# Patient Record
Sex: FEMALE | Race: WHITE
[De-identification: names, ages, dates, MRNs, and addresses within clinical notes are randomized per-mention and may not be internally consistent; named-entity substitution may affect disease eponyms.]

---

## 2017-10-26 NOTE — MM
Reason for exam: additional evaluation requested from abnormal screening.

Last mammogram was performed less than 1 month ago.



History:

Patient is postmenopausal.

Took estrogen for 12 years beginning at age 49.



Physical Findings:

Nurse did not find any significant physical abnormalities on exam.



MG 3D Work Up W/Cad LT

Spot compression CC, spot compression MLO, and ML view(s) were taken of the left 

breast.

Prior study comparison: October 24, 2017, bilateral MG 3d screening mammo w/cad.  

October 18, 2016, bilateral MG screening mammo w CAD.

The breast tissue is heterogeneously dense. This may lower the sensitivity of 

mammography.  There is no discrete abnormality.



These results were verbally communicated with the patient and result sheet given 

to the patient on 10/26/17.





ASSESSMENT: Negative, BI-RAD 1



RECOMMENDATION:

Return to routine screening mammogram schedule for both breasts.

## 2018-08-21 ENCOUNTER — HOSPITAL ENCOUNTER (OUTPATIENT)
Dept: HOSPITAL 47 - LABWHC1 | Age: 68
Discharge: HOME | End: 2018-08-21
Attending: ORTHOPAEDIC SURGERY
Payer: MEDICARE

## 2018-08-21 DIAGNOSIS — Z01.812: Primary | ICD-10-CM

## 2018-08-21 DIAGNOSIS — N28.9: ICD-10-CM

## 2018-08-21 LAB — BUN SERPL-SCNC: 20 MG/DL (ref 7–17)

## 2018-08-21 PROCEDURE — 36415 COLL VENOUS BLD VENIPUNCTURE: CPT

## 2018-08-21 PROCEDURE — 84520 ASSAY OF UREA NITROGEN: CPT

## 2018-08-21 PROCEDURE — 82565 ASSAY OF CREATININE: CPT

## 2018-09-17 ENCOUNTER — HOSPITAL ENCOUNTER (OUTPATIENT)
Dept: HOSPITAL 47 - OR | Age: 68
Discharge: HOME | End: 2018-09-17
Attending: ORTHOPAEDIC SURGERY
Payer: MEDICARE

## 2018-09-17 VITALS — RESPIRATION RATE: 16 BRPM

## 2018-09-17 VITALS — TEMPERATURE: 96.8 F

## 2018-09-17 VITALS — HEART RATE: 64 BPM | DIASTOLIC BLOOD PRESSURE: 79 MMHG | SYSTOLIC BLOOD PRESSURE: 126 MMHG

## 2018-09-17 VITALS — BODY MASS INDEX: 24.7 KG/M2

## 2018-09-17 DIAGNOSIS — I10: ICD-10-CM

## 2018-09-17 DIAGNOSIS — E78.5: ICD-10-CM

## 2018-09-17 DIAGNOSIS — R22.41: Primary | ICD-10-CM

## 2018-09-17 DIAGNOSIS — Z79.899: ICD-10-CM

## 2018-09-17 DIAGNOSIS — M81.0: ICD-10-CM

## 2018-09-17 PROCEDURE — 88304 TISSUE EXAM BY PATHOLOGIST: CPT

## 2018-09-17 PROCEDURE — 27340 REMOVAL OF KNEECAP BURSA: CPT

## 2018-09-19 NOTE — P.OP
Date of Procedure: 09/17/18


Procedure(s) Performed: 


incisional biopsy anterior right knee mass


mass c/w bursal tissue with trapped fluid.  3cm size


no cyst seen


sent to pathology





PREOPERATIVE DIAGNOSES:    1.  Right knee anterior mass consistent with cyst





POSTOPERATIVE DIAGNOSES:   1.  Right knee anterior mass, consistent with 

trapped fluid in prepatellar bursa





PROCEDURES PERFORMED:   1.   Right knee anterior mass excisional biopsy





ANESTHESIA:    General 





ESTIMATED BLOOD LOSS:   Less than 25 cc





TOURNIQUET:         None





ASSISTANT:          None   





COMPLICATIONS:       None





DISPOSITION:         To postanesthesia care unit





INDICATIONS: Mrs. Corona is a 68-year-old female who has for several months 

noticed a mass that has been bothering her because of its location on the 

anterior aspect of her right knee.  She wishes to have the mass excised.  I 

have explained the details of this surgery thoroughly and also explained the 

potential risks and complications.  These are inclusive of, but not limited to: 

bleeding, infection, scarring, discomfort, blood vessel and nerve damage, 

stiffness, weakness, need for further surgery, recurrence, failure to relieve 

symptoms, possibility of malignancy, persistence or worsening of problems, death

, and other risks. Patient is aware of these risks and agrees to proceed with 

surgery.  The consent form has been signed. 





PROCEDURE: Appropriate consent was obtained from the patient, who was then 

taken to the operating room and placed in the supine position.  Sedation was 

initiated.  Positioning was performed with care to make sure that all pressure 

points were adequately padded.  Bear-hugger was used along with leg sequential 

compression device(s).  Prepping and draping was completed in the usual aseptic 

fashion using Chloraprep.  Timeout was called, confirming patient identity, side

, procedure, and administration of antibiotics.  The patient received no 

intravenous antibiotics prior to incision. 





Oblique incision along Marry's lines was created directly over the mass for a 

distance of approximately 1.5 inches.  Incision was carried down just through 

skin and into subcutaneous tissue where the mass was easily located.  Careful 

dissection around the mass occurred using dissecting scissors.  The mass was 

consistent with trapped fluid within the prepatellar bursa.  There was really 

no significant cyst wall present, just collagenous/scar tissue within the 

prepatellar bursa that had last around a small 3 cm fluid collection.  No 

evidence of infection.





Bursal material in this region was removed carefully using dissecting scissors.

  Resected material was sent to pathology for analysis.  Typical serous type 

fluid was able to be evacuated from the fluid collection.  No evidence of pus.





Incision was irrigated out thoroughly with normal saline and hemostasis was 

obtained using electrocautery.  Subcutaneous tissue was closed using 2-0 Vicryl 

suture, followed by skin closure with 4-0 Monocryl suture and Dermabond.  

Sterile dressing was then applied.





Patient tolerated the procedure well and was transferred to recovery room in 

stable condition.  Sponge and needle counts are correct.

## 2018-10-18 ENCOUNTER — HOSPITAL ENCOUNTER (OUTPATIENT)
Dept: HOSPITAL 47 - LABWHC1 | Age: 68
End: 2018-10-18
Attending: INTERNAL MEDICINE
Payer: MEDICARE

## 2018-10-18 DIAGNOSIS — E55.9: Primary | ICD-10-CM

## 2018-10-18 DIAGNOSIS — I10: ICD-10-CM

## 2018-10-18 LAB
ALBUMIN SERPL-MCNC: 4.1 G/DL (ref 3.5–5)
ALP SERPL-CCNC: 45 U/L (ref 38–126)
ALT SERPL-CCNC: 50 U/L (ref 9–52)
ANION GAP SERPL CALC-SCNC: 8 MMOL/L
AST SERPL-CCNC: 41 U/L (ref 14–36)
BASOPHILS # BLD AUTO: 0 K/UL (ref 0–0.2)
BASOPHILS NFR BLD AUTO: 1 %
BUN SERPL-SCNC: 16 MG/DL (ref 7–17)
CALCIUM SPEC-MCNC: 9.6 MG/DL (ref 8.4–10.2)
CHLORIDE SERPL-SCNC: 105 MMOL/L (ref 98–107)
CHOLEST SERPL-MCNC: 243 MG/DL (ref ?–200)
CO2 SERPL-SCNC: 29 MMOL/L (ref 22–30)
EOSINOPHIL # BLD AUTO: 0.4 K/UL (ref 0–0.7)
EOSINOPHIL NFR BLD AUTO: 6 %
ERYTHROCYTE [DISTWIDTH] IN BLOOD BY AUTOMATED COUNT: 4.75 M/UL (ref 3.8–5.4)
ERYTHROCYTE [DISTWIDTH] IN BLOOD: 12.7 % (ref 11.5–15.5)
GLUCOSE SERPL-MCNC: 104 MG/DL (ref 74–99)
HCT VFR BLD AUTO: 43.5 % (ref 34–46)
HDLC SERPL-MCNC: 82 MG/DL (ref 40–60)
HGB BLD-MCNC: 14 GM/DL (ref 11.4–16)
LDLC SERPL CALC-MCNC: 134 MG/DL (ref 0–99)
LYMPHOCYTES # SPEC AUTO: 2.1 K/UL (ref 1–4.8)
LYMPHOCYTES NFR SPEC AUTO: 36 %
MCH RBC QN AUTO: 29.4 PG (ref 25–35)
MCHC RBC AUTO-ENTMCNC: 32.2 G/DL (ref 31–37)
MCV RBC AUTO: 91.4 FL (ref 80–100)
MONOCYTES # BLD AUTO: 0.4 K/UL (ref 0–1)
MONOCYTES NFR BLD AUTO: 6 %
NEUTROPHILS # BLD AUTO: 2.8 K/UL (ref 1.3–7.7)
NEUTROPHILS NFR BLD AUTO: 49 %
PH UR: 7.5 [PH] (ref 5–8)
PLATELET # BLD AUTO: 267 K/UL (ref 150–450)
POTASSIUM SERPL-SCNC: 4.3 MMOL/L (ref 3.5–5.1)
PROT SERPL-MCNC: 7.1 G/DL (ref 6.3–8.2)
SODIUM SERPL-SCNC: 142 MMOL/L (ref 137–145)
SP GR UR: 1.01 (ref 1–1.03)
SQUAMOUS UR QL AUTO: 3 /HPF (ref 0–4)
TRIGL SERPL-MCNC: 135 MG/DL (ref ?–150)
UROBILINOGEN UR QL STRIP: <2 MG/DL (ref ?–2)
WBC # BLD AUTO: 5.8 K/UL (ref 3.8–10.6)
WBC #/AREA URNS HPF: 2 /HPF (ref 0–5)

## 2018-10-18 PROCEDURE — 80061 LIPID PANEL: CPT

## 2018-10-18 PROCEDURE — 85025 COMPLETE CBC W/AUTO DIFF WBC: CPT

## 2018-10-18 PROCEDURE — 82306 VITAMIN D 25 HYDROXY: CPT

## 2018-10-18 PROCEDURE — 81001 URINALYSIS AUTO W/SCOPE: CPT

## 2018-10-18 PROCEDURE — 36415 COLL VENOUS BLD VENIPUNCTURE: CPT

## 2018-10-18 PROCEDURE — 80053 COMPREHEN METABOLIC PANEL: CPT

## 2018-10-25 ENCOUNTER — HOSPITAL ENCOUNTER (OUTPATIENT)
Dept: HOSPITAL 47 - RADMAMWWP | Age: 68
Discharge: HOME | End: 2018-10-25
Attending: INTERNAL MEDICINE
Payer: MEDICARE

## 2018-10-25 DIAGNOSIS — Z12.31: Primary | ICD-10-CM

## 2018-10-25 PROCEDURE — 77063 BREAST TOMOSYNTHESIS BI: CPT

## 2018-10-25 PROCEDURE — 77067 SCR MAMMO BI INCL CAD: CPT

## 2018-10-25 NOTE — MM
Reason for exam: screening  (asymptomatic).

Last mammogram was performed 1 year ago.



History:

Patient is postmenopausal.

Took estrogen for 12 years beginning at age 49.



Physical Findings:

A clinical breast exam by your physician is recommended on an annual basis and 

results should be correlated with mammographic findings.



MG 3D Screening Mammo W/Cad

Bilateral CC and MLO view(s) were taken.

Prior study comparison: October 26, 2017, left breast MG 3d work up w/cad LT.  

October 24, 2017, bilateral MG 3d screening mammo w/cad.

The breast tissue is heterogeneously dense. This may lower the sensitivity of 

mammography.  No significant changes when compared with prior studies.





ASSESSMENT: Negative, BI-RAD 1



RECOMMENDATION:

Routine screening mammogram of both breasts in 1 year.

## 2019-10-23 ENCOUNTER — HOSPITAL ENCOUNTER (OUTPATIENT)
Dept: HOSPITAL 47 - LABWHC1 | Age: 69
Discharge: HOME | End: 2019-10-23
Attending: INTERNAL MEDICINE
Payer: MEDICARE

## 2019-10-23 DIAGNOSIS — Z87.39: ICD-10-CM

## 2019-10-23 DIAGNOSIS — I10: ICD-10-CM

## 2019-10-23 DIAGNOSIS — G47.00: Primary | ICD-10-CM

## 2019-10-23 LAB
ALBUMIN SERPL-MCNC: 4.2 G/DL (ref 3.8–4.9)
ALBUMIN/GLOB SERPL: 2.47 G/DL (ref 1.6–3.17)
ALP SERPL-CCNC: 48 U/L (ref 41–126)
ALT SERPL-CCNC: 33 U/L (ref 8–44)
ANION GAP SERPL CALC-SCNC: 7.1 MMOL/L (ref 4–12)
AST SERPL-CCNC: 26 U/L (ref 13–35)
BASOPHILS # BLD AUTO: 0 K/UL (ref 0–0.2)
BASOPHILS NFR BLD AUTO: 1 %
BUN SERPL-SCNC: 19 MG/DL (ref 9–27)
BUN/CREAT SERPL: 23.75 RATIO (ref 12–20)
CALCIUM SPEC-MCNC: 9.6 MG/DL (ref 8.7–10.3)
CHLORIDE SERPL-SCNC: 107 MMOL/L (ref 96–109)
CHOLEST SERPL-MCNC: 205 MG/DL (ref 0–200)
CO2 SERPL-SCNC: 28.9 MMOL/L (ref 21.6–31.8)
EOSINOPHIL # BLD AUTO: 0.3 K/UL (ref 0–0.7)
EOSINOPHIL NFR BLD AUTO: 6 %
ERYTHROCYTE [DISTWIDTH] IN BLOOD BY AUTOMATED COUNT: 4.41 M/UL (ref 3.8–5.4)
ERYTHROCYTE [DISTWIDTH] IN BLOOD: 12.3 % (ref 11.5–15.5)
GLOBULIN SER CALC-MCNC: 1.7 G/DL (ref 1.6–3.3)
GLUCOSE SERPL-MCNC: 91 MG/DL (ref 70–110)
HCT VFR BLD AUTO: 39.8 % (ref 34–46)
HDLC SERPL-MCNC: 70 MG/DL (ref 40–60)
HGB BLD-MCNC: 13.3 GM/DL (ref 11.4–16)
LDLC SERPL CALC-MCNC: 111.2 MG/DL (ref 0–131)
LYMPHOCYTES # SPEC AUTO: 1.8 K/UL (ref 1–4.8)
LYMPHOCYTES NFR SPEC AUTO: 36 %
MCH RBC QN AUTO: 30.1 PG (ref 25–35)
MCHC RBC AUTO-ENTMCNC: 33.4 G/DL (ref 31–37)
MCV RBC AUTO: 90.3 FL (ref 80–100)
MONOCYTES # BLD AUTO: 0.4 K/UL (ref 0–1)
MONOCYTES NFR BLD AUTO: 8 %
NEUTROPHILS # BLD AUTO: 2.3 K/UL (ref 1.3–7.7)
NEUTROPHILS NFR BLD AUTO: 47 %
PH UR: 6 [PH] (ref 5–8)
PLATELET # BLD AUTO: 294 K/UL (ref 150–450)
POTASSIUM SERPL-SCNC: 4 MMOL/L (ref 3.5–5.5)
PROT SERPL-MCNC: 5.9 G/DL (ref 6.2–8.2)
RBC UR QL: 1 /HPF (ref 0–5)
SODIUM SERPL-SCNC: 143 MMOL/L (ref 135–145)
SP GR UR: 1.01 (ref 1–1.03)
SQUAMOUS UR QL AUTO: 3 /HPF (ref 0–4)
TRIGL SERPL-MCNC: 119 MG/DL (ref 0–149)
UROBILINOGEN UR QL STRIP: <2 MG/DL (ref ?–2)
VLDLC SERPL CALC-MCNC: 23.8 MG/DL (ref 5–40)
WBC # BLD AUTO: 4.9 K/UL (ref 3.8–10.6)
WBC #/AREA URNS HPF: 3 /HPF (ref 0–5)

## 2019-10-23 PROCEDURE — 36415 COLL VENOUS BLD VENIPUNCTURE: CPT

## 2019-10-23 PROCEDURE — 85025 COMPLETE CBC W/AUTO DIFF WBC: CPT

## 2019-10-23 PROCEDURE — 80061 LIPID PANEL: CPT

## 2019-10-23 PROCEDURE — 84443 ASSAY THYROID STIM HORMONE: CPT

## 2019-10-23 PROCEDURE — 81001 URINALYSIS AUTO W/SCOPE: CPT

## 2019-10-23 PROCEDURE — 80053 COMPREHEN METABOLIC PANEL: CPT

## 2019-10-23 PROCEDURE — 82306 VITAMIN D 25 HYDROXY: CPT

## 2020-09-30 ENCOUNTER — HOSPITAL ENCOUNTER (OUTPATIENT)
Dept: HOSPITAL 47 - WWCWWP | Age: 70
Discharge: HOME | End: 2020-09-30
Attending: OBSTETRICS & GYNECOLOGY
Payer: MEDICARE

## 2020-09-30 VITALS
DIASTOLIC BLOOD PRESSURE: 85 MMHG | HEART RATE: 67 BPM | TEMPERATURE: 98.1 F | SYSTOLIC BLOOD PRESSURE: 128 MMHG | RESPIRATION RATE: 18 BRPM

## 2020-09-30 DIAGNOSIS — N76.2: Primary | ICD-10-CM

## 2020-09-30 DIAGNOSIS — R35.0: ICD-10-CM

## 2020-09-30 LAB
PH UR: 5 [PH] (ref 5–8)
SP GR UR: 1.01 (ref 1–1.03)
UROBILINOGEN UR QL STRIP: <2 MG/DL (ref ?–2)

## 2020-09-30 PROCEDURE — 87660 TRICHOMONAS VAGIN DIR PROBE: CPT

## 2020-09-30 PROCEDURE — 81003 URINALYSIS AUTO W/O SCOPE: CPT

## 2020-09-30 PROCEDURE — 87510 GARDNER VAG DNA DIR PROBE: CPT

## 2020-09-30 PROCEDURE — 87480 CANDIDA DNA DIR PROBE: CPT

## 2020-09-30 NOTE — P.HPOB
History of Present Illness


H&P Date: 20


Chief Complaint: Vulvar irritation for 1 month


This is a 70-year-old  0-2 with an LMP of .  She is status post SEBASTIEN/BSO

for uterine fibroids.  Her last well woman exam was done in 2019 in 

[place].  She also had a mammogram done at that time and was benign.  She is 

complaining of intermittent vulvar irritation greatest in the area of the labia 

minora.  She started noticing this around .  It does seem to be 

intermittent and can feel like irritation, pruritus or soreness.  The location 

seems to be variable and sometimes on the right and sometimes on the left side. 

She especially notices this around the time of wiping.  She denies any vaginal 

discharge or vaginal odor.  She is sexually active and has been noticing 

significant vaginal dryness and discomfort with sexual intercourse even despite 

using a lubricant.  She has not been sexually active for the last couple of 

weeks because of the vulvar irritation.  She has a history of genital HSV about 

10 years ago but has not had outbreaks for several years.  She states the 

irritation does not feel like the HSV outbreak that she had in the past.  She 

had taken an antibiotic around the second week of September for an insect bite. 

Her vulvar irritation started before the antibiotic was started.  She has been 

having some urinary frequency during the past month without dysuria.  She has 

not gotten frequent urinary tract infections or yeast infections in the past.  

She is in a monogamous relationship and does not feel she is at risk for 

sexually transmitted infections.  She has looked at the vulva and has not 

noticed any pallor or white patches.








Review of Systems


She denies fever.  She denies respiratory, cardiac, or GI problems.  : As 

above.








Past Medical History


Past Medical History: Hyperlipidemia, Hypertension, Osteoarthritis (OA)


Additional Past Medical History / Comment(s): MASS FRONT OF RIGHT KNEE.  

Osteoporosis which went to osteopenia after several years use of Actonel and 

Prolia.  Past GYN history: genital HSV in the past.


History of Any Multi-Drug Resistant Organisms: None Reported


Past Surgical History: Hysterectomy


Additional Past Surgical History / Comment(s): SEBASTIEN/BSO .  COLONOSCOPY .


Past Anesthesia/Blood Transfusion Reactions: No Reported Reaction


Past Psychological History: No Psychological Hx Reported


Smoking Status: Never smoker


Past Alcohol Use History: Occasional


Past Drug Use History: None Reported


Additional History: She has been  since  and is retired from [employer] 

[employer].  She spends her Fisher in [place].





- Past Family History


  ** Mother


Family Medical History: No Reported History


Additional Family Medical History / Comment(s): She denies family history of 

cancer of the breast uterus or ovaries or colon.





Medications and Allergies


                                Home Medications











 Medication  Instructions  Recorded  Confirmed  Type


 


Eszopiclone [Lunesta] 3 mg PO HS 14 History


 


Lisinopril [Zestril] 10 mg PO DAILY 14 History


 


Simvastatin [Zocor] 10 mg PO DAILY 14 History


 


hydroCHLOROthiazide [Hydrodiuril] 12.5 mg PO DAILY 14 History


 


Biotin 10 mg PO DAILY 14 History


 


Cholecalciferol [Vitamin D3] 1,000 unit PO DAILY 14 History


 


Calcium Carbonate [Calcium] 1,200 mg PO DAILY 20 History


 


Loteprednol Etabonate [Lotemax] 5 ml BOTH EYES DAILY PRN 20 

History








                                    Allergies











Allergy/AdvReac Type Severity Reaction Status Date / Time


 


No Known Allergies Allergy   Verified 18 11:32














Exam


                                   Vital Signs











  Temp Pulse Resp BP Pulse Ox


 


 20 12:15  98.1 F  67  18  128/85  99








                                Intake and Output











 20





 22:59 06:59 14:59


 


Other:   


 


  Weight   62.142 kg











Height 5 feet 2-1/2 inches, weight 137 pounds, BMI 24.7.





This is a well-developed well-nourished white female who is alert and oriented 

times 3 in no acute distress.





ABDOMEN: Soft, nontender, without palpable masses.  


PELVIC EXAM: External genitalia reveals mild to moderate atrophy with mild 

generalized erythema.  There are no focal lesions, leukoplakia, or ulceration.  

Vagina appears normal with mild to moderate atrophy. There is no evidence of 

prolapse. Bimanual examination is negative for mass or tenderness.








IMPRESSION:


1.  70-year-old menopausal female status post SEBASTIEN/BSO for benign reasons with 

acute vulvitis possibly secondary to genital atrophy or recent irritant.  The 

exam is not consistent with lichen sclerosus or genital HSV.  Differential 

diagnosis will also include Candida vaginitis, Gardnerella bacterial vaginosis 

and less likely secondary to UTI.


2.  Mild urinary frequency which may or may not be related to #1.


3.  Dyspareunia with vaginal dryness secondary to genital atrophy.





PLAN: 


1.  Affirm testing for chlamydia, Gardnerella, and Trichomonas was obtained from

 the vagina.


2.  Urine by clean catch for Urinalysis has been obtained.


3.  Kenalog 0.1% cream twice a day as needed.  She was advised to avoid over 

washing, scratching, and rubbing the affected area.


4.  When the vulvar irritation has improved, she will have a trial of Premarin 

vaginal cream 1-2 g intravaginally 2 times weekly.  The electronic prescriptions

 will be sent to [facility] pharmacy on [address].


5.  She was instructed to call if symptoms are not improving or if problems.


6. She was advised to return in one year for her annual well woman exam.  She 

may have this done in [place] since she spends much of the year in [place].





Total time spent with patient 25 minutes.

## 2020-10-01 NOTE — P.PN
Progress Note - Text


Progress Note Date: 10/01/20


The patient was notified by phone of the negative urinalysis and negative affirm

testing for Candida, Gardnerella, and Trichomonas. She will use the Kenalog 

cream as directed, then if feeling better, will use the Premarin vaginal cream 

as directed.

## 2021-07-29 ENCOUNTER — HOSPITAL ENCOUNTER (OUTPATIENT)
Dept: HOSPITAL 47 - RADMAMWWP | Age: 71
Discharge: HOME | End: 2021-07-29
Attending: INTERNAL MEDICINE
Payer: MEDICARE

## 2021-07-29 DIAGNOSIS — Z78.0: ICD-10-CM

## 2021-07-29 DIAGNOSIS — Z12.31: Primary | ICD-10-CM

## 2021-07-29 PROCEDURE — 77063 BREAST TOMOSYNTHESIS BI: CPT

## 2021-07-29 PROCEDURE — 77067 SCR MAMMO BI INCL CAD: CPT

## 2021-08-02 NOTE — MM
Reason for exam: screening  (asymptomatic).

Last mammogram was performed 2 years and 9 months ago.



History:

Patient is postmenopausal.

Took hormonal contraceptives for 15 years.  Took estrogen for 12 years beginning 

at age 49.



Physical Findings:

A clinical breast exam by your physician is recommended on an annual basis and 

results should be correlated with mammographic findings.



MG 3D Screening Mammo W/Cad

Bilateral CC and MLO view(s) were taken.

Prior study comparison: October 25, 2018, bilateral MG 3d screening mammo w/cad.  

October 26, 2017, left breast MG 3d work up w/cad LT.

The breast tissue is heterogeneously dense. This may lower the sensitivity of 

mammography.  Lateral asymmetric density left CC view does not persist on 3D 

images.  No significant changes when compared with prior studies.





ASSESSMENT: Benign, BI-RAD 2



RECOMMENDATION:

Routine screening mammogram of both breasts in 1 year.

Patient should continue monthly self breast exams.

A negative report should not preclude additional follow up of suspicious palpable 

abnormalities.

## 2022-08-10 ENCOUNTER — HOSPITAL ENCOUNTER (OUTPATIENT)
Dept: HOSPITAL 47 - RADMAMWWP | Age: 72
Discharge: HOME | End: 2022-08-10
Attending: FAMILY MEDICINE
Payer: MEDICARE

## 2022-08-10 DIAGNOSIS — M85.89: ICD-10-CM

## 2022-08-10 DIAGNOSIS — Z12.31: Primary | ICD-10-CM

## 2022-08-10 PROCEDURE — 77080 DXA BONE DENSITY AXIAL: CPT

## 2022-08-10 PROCEDURE — 77067 SCR MAMMO BI INCL CAD: CPT

## 2022-08-10 PROCEDURE — 77063 BREAST TOMOSYNTHESIS BI: CPT

## 2022-08-10 NOTE — BD
EXAMINATION TYPE: Axial Bone Density

 

DATE OF EXAM: 8/10/2022

 

COMPARISON: 10.24.2017

 

CLINICAL HISTORY: 71 years year old Female.  ICD-10 CODE:  M85.88 OTH DISRD OF BONE DENSITY

 

 

Height:  62

Weight:  142

 

FRAX RISK QUESTIONS:

NOTHING TO NOTE HERE

 

RISK FACTORS 

HISTORY OF: 

History of Wrist Fracture: RT WRIST FX AS A YOUTH

Postmenopausal woman: YES, AT AGE 49...TOTAL HYST

Take estrogen and/or progesterone medications: YES, IN THE PAST FOR ABOUT 15 YRS

Lost more than 2 inches in height since high school: YES

Hyperparathyroidism: NO

Adrenal Insufficiency: NO

 

MEDICATIONS: 

Osteoporosis Medications: YES IN THE PAST FOR ABOUT 15YRS AGO FOR FOSAMAX, AND 10YRS AGO X2 YRS OF [place]
JOHNATHON, NOTHING NOW

Additional Medications: BP MEDS, STATIN FOR CHOLESTEROL, 

Additional History: HYPERTENSION, OSTEOPENIA, CHOLESTEROL

 

EXAM MEASUREMENTS: 

Bone mineral densitometry was performed using the [employer].

Bone mineral density as measured about the Lumbar spine is:  

----- L1-L4(G/cm2): 1.018

T Score Values are as follows:

----- L1:   -1.6

----- L2:   -2.3

----- L3:   -1.0

----- L4:   -0.9

----- L1-L4:   -1.3

Bone mineral density has: Increased 4.4% since study of: 10.24.2017

 

Bone mineral density about the R hip (g/cm2): 0.887

Bone mineral density about the L hip (g/cm2):  0.956

T Score values are as follows:

-----R Neck:   -0.6

-----L Neck:   -1.2

-----R Total:   -1.0

-----L Total:   -0.4

Bone mineral density has: Increased 2.1% since study of: 10.24.2017

 

FRAX%s: The graph provided illustrates a 15.1% chance for a major osteoporotic fx and a 1.8% chance f
or the hips probability for fx in 10 years time.

 

IMPRESSION:

Osteopenia (T Score between -2.5 and -1).

 

There is slightly increased risk of fracture and the patient may be considered 

for treatment. 

 

Re-Screen 2-5 years.

 

NOTE:  T-SCORE=SD OF THE YOUNG ADULT MEAN.

## 2022-08-10 NOTE — MM
Reason for Exam: Screening  (asymptomatic). 

Last mammogram was performed 1 year(s) and 1 month(s) ago. 





Patient History: 

Menarche at age 13. First Full-Term Pregnancy at age 27. Left ovary removed at age 47. Right ovary

removed at age 47. Hysterectomy at age 47. Postmenopausal. Estrogen for 12 years from age 49 until

age 61. Patient used Hormonal Contraceptives for 15 years. 





Risk Values: 

Tamia 5 year model risk: 1.9%.

NCI Lifetime model risk: 5.6%.





Prior Study Comparison: 

10/26/2017 Left Diagnostic Mammogram, [facility]. 10/25/2018 Bilateral Screening Mammogram, [facility]. 7/29/2021

Bilateral Screening Mammogram, [facility]. 





Tissue Density: 

The breast tissue is heterogeneously dense. This may lower the sensitivity of mammography.





Findings: 

Analyzed By CAD. 

There is no suspicious group of microcalcifications or new suspicious mass in either breast. No

significant change from prior exams. 





Overall Assessment: Negative, BI-RAD 1





Management: 

Screening Mammogram of both breasts in 1 year.

A clinical breast exam by your physician is recommended on an annual basis and results should be

correlated with mammographic findings.



Electronically signed and approved by: Virgilio Maher D.O.

## 2023-05-30 ENCOUNTER — HOSPITAL ENCOUNTER (OUTPATIENT)
Dept: HOSPITAL 47 - LABWHC1 | Age: 73
Discharge: HOME | End: 2023-05-30
Attending: FAMILY MEDICINE
Payer: MEDICARE

## 2023-05-30 DIAGNOSIS — R55: ICD-10-CM

## 2023-05-30 DIAGNOSIS — R05.9: Primary | ICD-10-CM

## 2023-05-30 DIAGNOSIS — R53.83: ICD-10-CM

## 2023-05-30 PROCEDURE — 36415 COLL VENOUS BLD VENIPUNCTURE: CPT

## 2023-06-20 ENCOUNTER — HOSPITAL ENCOUNTER (OUTPATIENT)
Dept: HOSPITAL 47 - RADCTMAIN | Age: 73
Discharge: HOME | End: 2023-06-20
Attending: INTERNAL MEDICINE
Payer: MEDICARE

## 2023-06-20 DIAGNOSIS — R91.8: ICD-10-CM

## 2023-06-20 DIAGNOSIS — R07.89: ICD-10-CM

## 2023-06-20 DIAGNOSIS — R59.1: Primary | ICD-10-CM

## 2023-06-20 LAB — BUN SERPL-SCNC: 18 MG/DL (ref 7–17)

## 2023-06-20 PROCEDURE — 36415 COLL VENOUS BLD VENIPUNCTURE: CPT

## 2023-06-20 PROCEDURE — 71260 CT THORAX DX C+: CPT

## 2023-06-20 PROCEDURE — 84520 ASSAY OF UREA NITROGEN: CPT

## 2023-06-20 PROCEDURE — 82565 ASSAY OF CREATININE: CPT

## 2023-06-20 NOTE — CT
EXAMINATION TYPE: CT chest w con

CT DLP: 164.9 mGycm, Automated exposure control for dose reduction was used.

 

DATE OF EXAM: 6/20/2023 2:38 PM

 

COMPARISON: None

 

CLINICAL INDICATION:Female, 72 years old with history of R91.8; chest tightness x5 weeks

 

TECHNIQUE: Multiple axial images were obtained through the chest. Sagittal and coronal reformats were
 created for review.

Contrast used:100 mL of Isovue 300 with IV Contrast

Oral contrast used:     none.

 

FINDINGS: 

LUNGS/ PLEURA: 

*  Left lateral chest wall fat-containing lesion likely representing a lipoma measuring 3.5 x 1.8 cm.


*  The right major fissure demonstrates masslike consolidation measuring 2.3 x 1.5 x 2.6 cm. Right hi
lar adenopathy measuring 2.5 x 2.0 cm.

*  Right lower lobe 3 mm pulmonary nodule. Series 3 image 33

*  Left upper lobe 2 mm pulmonary nodule. Series 3 image 17

*  No focal consolidation, pneumothorax or pleural effusion.

AIRWAY: Patent and unremarkable.

HEART: Size within normal limits. Mild atherosclerosis of the arterial vasculature.

MEDIASTINUM: Prominent lymph nodes which are not enlarged by CT criteria are seen throughout the medi
astinum including right low paratracheal measuring 9 mm there is also some fluid in the pericardial r
ecesses.

VASCULATURE:  No aortic aneurysm. 

MUSCULOSKELETAL: No acute osseous abnormalities

SOFT TISSUES/LYMPH NODES: Unremarkable.

LOWER NECK: Left thyroid gland nodules.

UPPER ABDOMEN: No significant findings. 

 

IMPRESSION: 

1.  Right major fissure masslike consolidation measuring up to 2.6 cm with right pulmonary hilum lymp
hadenopathy and prominent mediastinal lymph nodes which are at this time enlarged. Findings concernin
g for malignancy with metastatic disease. Further evaluation with PET/CTs recommended.

2.  Additional left chest wall suspected lipoma.

## 2023-07-19 ENCOUNTER — HOSPITAL ENCOUNTER (OUTPATIENT)
Dept: HOSPITAL 47 - LABWHC1 | Age: 73
Discharge: HOME | End: 2023-07-19
Attending: INTERNAL MEDICINE
Payer: MEDICARE

## 2023-07-19 DIAGNOSIS — A07.3: ICD-10-CM

## 2023-07-19 DIAGNOSIS — M35.00: Primary | ICD-10-CM

## 2023-07-19 LAB
ALBUMIN SERPL-MCNC: 4.4 D/DL (ref 3.8–4.9)
ALBUMIN/GLOB SERPL: 1.63 RATIO (ref 1.6–3.17)
ALP SERPL-CCNC: 65 U/L (ref 41–126)
ALT SERPL-CCNC: 21 U/L (ref 8–44)
ANION GAP SERPL CALC-SCNC: 13.3 MMOL/L (ref 4–12)
AST SERPL-CCNC: 21 U/L (ref 13–35)
BASOPHILS # BLD AUTO: 0.03 X 10*3/UL (ref 0–0.1)
BASOPHILS NFR BLD AUTO: 0.5 %
BUN SERPL-SCNC: 21.6 MG/DL (ref 9–27)
BUN/CREAT SERPL: 21.6 RATIO (ref 12–20)
CALCIUM SPEC-MCNC: 10.4 MG/DL (ref 8.7–10.3)
CHLORIDE SERPL-SCNC: 104 MMOL/L (ref 96–109)
CO2 SERPL-SCNC: 26.7 MMOL/L (ref 21.6–31.8)
EOSINOPHIL # BLD AUTO: 0.31 X 10*3/UL (ref 0.04–0.35)
EOSINOPHIL NFR BLD AUTO: 5.2 %
ERYTHROCYTE [DISTWIDTH] IN BLOOD BY AUTOMATED COUNT: 4.39 X 10*6/UL (ref 4.1–5.2)
ERYTHROCYTE [DISTWIDTH] IN BLOOD: 13.3 % (ref 11.5–14.5)
GLOBULIN SER CALC-MCNC: 2.7 D/DL (ref 1.6–3.3)
GLUCOSE SERPL-MCNC: 102 MG/DL (ref 70–110)
HCT VFR BLD AUTO: 39.8 % (ref 37.2–46.3)
HGB BLD-MCNC: 12.6 D/DL (ref 12–15)
IMM GRANULOCYTES BLD QL AUTO: 0.2 %
LYMPHOCYTES # SPEC AUTO: 1.92 X 10*3/UL (ref 0.9–5)
LYMPHOCYTES NFR SPEC AUTO: 32 %
MCH RBC QN AUTO: 28.7 PG (ref 27–32)
MCHC RBC AUTO-ENTMCNC: 31.7 D/DL (ref 32–37)
MCV RBC AUTO: 90.7 FL (ref 80–97)
MONOCYTES # BLD AUTO: 0.48 X 10*3/UL (ref 0.2–1)
MONOCYTES NFR BLD AUTO: 8 %
NEUTROPHILS # BLD AUTO: 3.25 X 10*3/UL (ref 1.8–7.7)
NEUTROPHILS NFR BLD AUTO: 54.1 %
NRBC BLD AUTO-RTO: 0 X 10*3/UL (ref 0–0.01)
PLATELET # BLD AUTO: 299 X 10*3/UL (ref 140–440)
POTASSIUM SERPL-SCNC: 5.1 MMOL/L (ref 3.5–5.5)
PROT SERPL-MCNC: 7.1 D/DL (ref 6.2–8.2)
SODIUM SERPL-SCNC: 144 MMOL/L (ref 135–145)
WBC # BLD AUTO: 6 X 10*3/UL (ref 4.5–10)

## 2023-07-19 PROCEDURE — 86235 NUCLEAR ANTIGEN ANTIBODY: CPT

## 2023-07-19 PROCEDURE — 85025 COMPLETE CBC W/AUTO DIFF WBC: CPT

## 2023-07-19 PROCEDURE — 80053 COMPREHEN METABOLIC PANEL: CPT

## 2023-07-19 PROCEDURE — 36415 COLL VENOUS BLD VENIPUNCTURE: CPT

## 2023-08-22 ENCOUNTER — HOSPITAL ENCOUNTER (OUTPATIENT)
Dept: HOSPITAL 47 - RADMAMWWP | Age: 73
Discharge: HOME | End: 2023-08-22
Attending: FAMILY MEDICINE
Payer: MEDICARE

## 2023-08-22 DIAGNOSIS — Z12.31: Primary | ICD-10-CM

## 2023-08-22 DIAGNOSIS — Z78.0: ICD-10-CM

## 2023-08-22 PROCEDURE — 77067 SCR MAMMO BI INCL CAD: CPT

## 2023-08-22 PROCEDURE — 77063 BREAST TOMOSYNTHESIS BI: CPT

## 2023-09-09 ENCOUNTER — HOSPITAL ENCOUNTER (OUTPATIENT)
Dept: HOSPITAL 47 - RADPETMAIN | Age: 73
Discharge: HOME | End: 2023-09-09
Attending: INTERNAL MEDICINE
Payer: MEDICARE

## 2023-09-09 DIAGNOSIS — E07.89: ICD-10-CM

## 2023-09-09 DIAGNOSIS — R91.1: Primary | ICD-10-CM

## 2023-09-09 PROCEDURE — 78815 PET IMAGE W/CT SKULL-THIGH: CPT

## 2023-09-11 NOTE — PE
EXAMINATION TYPE: PET CT fusion skull to thigh

 

DATE OF EXAM: 9/9/2023

 

CLINICAL INDICATION:Female, 73 years old with history of R91.1;

 

TECHNIQUE:  Following the intravenous administration of  12.52 mCi of F-18 FDG, whole body images are
 performed from the skull base to the midthigh.  Images are reviewed on the computer in the coronal, 
axial, and sagittal planes.  Reconstructed rotating images are created on independent workstation and
 reviewed on the computer.   A non-contrast CT is performed in conjunction with the PET scan. Glucose
 level 89 mg/dL

CT DLP: 267.73 mGycm, Automated exposure control for dose reduction was used.

 

COMPARISON: CT 6/20/2023, PET/CT None, 

 

FINDINGS: 

Mediastinal SUV mean is 2.2. Hepatic parenchyma SUV mean is 3.1. 

 

SKULL BASE AND NECK:  

Asymmetric left thyroid gland FDG activity max SUV 3.4

 

 

CHEST, MEDIASTINUM, AND HILAR REGION: 

Abnormal FDG activity identified: Examples include

*  Right low paratracheal 5 mm lymph node max SUV 3.9

*  Right pulmonary hilum lymph node which is difficult to measure max SUV 7.3.

*  Prevascular space lymph node measuring 5 mm Max SUV 4.5

*  Right pulmonary nodule measuring 18 mm and max SUV 2.0.

*  Right upper lobe 4 mm pulmonary nodule, right lower lung medial pulmonary nodule measuring 4 mm, l
ingular 5 mm pulmonary nodule and other scattered subcentimeter pulmonary nodules are below the sensi
tivity for PET/CT.

 

 

Left chest wall lipoma measuring up to 3.4 x 1.8 cm.

 

ABDOMEN AND PELVIS: No suspicious radiotracer activity.

 

MUSCULOSKELETAL STRUCTURES: No suspicious radiotracer activity.

 

OTHER CT: Atherosclerosis at the carotid bifurcations. Coronary artery calcifications. Moderate stool
 burden throughout the colon. There is scattered colonic diverticula. Fat-containing inguinal hernias
 bilaterally. 

 

IMPRESSION: 

1.  FDG avid lymph nodes throughout the mediastinum suspicious for malignancy.

2.  Right lower lobe pulmonary nodule with low levels FDG activity however they remain suspicious for
 malignancy and tissue sampling should be performed. Bronchioloalveolar malignancy can have low FDG l
evels.

3.  Scattered subcentimeter pulmonary nodules which are below the sensitivity for PET/CT. Attention o
n follow-up imaging. Additional sites of metastatic disease remain in the differential.

4.  Asymmetric left thyroid gland uptake in the inferior aspect on the left. Correlate with dedicated
 thyroid ultrasound is recommended.

## 2024-08-06 ENCOUNTER — HOSPITAL ENCOUNTER (OUTPATIENT)
Dept: HOSPITAL 47 - LABPRL | Age: 74
Discharge: HOME | End: 2024-08-06
Attending: FAMILY MEDICINE
Payer: MEDICARE

## 2024-08-06 DIAGNOSIS — I10: Primary | ICD-10-CM

## 2024-08-06 DIAGNOSIS — E78.00: ICD-10-CM

## 2024-08-06 DIAGNOSIS — M85.9: ICD-10-CM

## 2024-08-06 PROCEDURE — 84443 ASSAY THYROID STIM HORMONE: CPT

## 2024-08-06 PROCEDURE — 85025 COMPLETE CBC W/AUTO DIFF WBC: CPT

## 2024-08-06 PROCEDURE — 80053 COMPREHEN METABOLIC PANEL: CPT

## 2024-08-06 PROCEDURE — 80061 LIPID PANEL: CPT

## 2024-08-06 PROCEDURE — 82306 VITAMIN D 25 HYDROXY: CPT

## 2024-08-19 NOTE — MM
Reason for Exam: Screening  (asymptomatic). 

Last screening mammogram was performed 12 month(s) ago.





Patient History: 

Menarche at age 13. First Full-Term Pregnancy at age 27. Left ovary removed at age 47. Right ovary

removed at age 47. Hysterectomy at age 47. Postmenopausal. Estrogen for 12 years from age 49 until

age 61. Patient used Hormonal Contraceptives for 15 years. 





Risk Values: 

Tamia 5 year model risk: 1.9%.

NCI Lifetime model risk: 5.4%.





Prior Study Comparison: 

10/25/2018 Bilateral Screening Mammogram, [facility]. 7/29/2021 Bilateral Screening Mammogram, [facility].

8/10/2022 Bilateral MG 3D screening mammo w/cad, [facility]. 





Tissue Density: 

The breast tissue is heterogeneously dense. This may lower the sensitivity of mammography.





Findings: 

Analyzed By CAD. 

There is no suspicious group of microcalcifications or new suspicious mass in either breast. 





Overall Assessment: Benign, BI-RAD 2





Management: 

Screening Mammogram of both breasts in 1 year.

.



Patient should continue monthly self-breast exams.  A clinical breast exam by your physician is

recommended on an annual basis.

This exam should not preclude additional follow-up of suspicious palpable abnormalities.



Note on Tamia scores and lifetime risk:

1. A Tamia score greater than 3% is considered moderate risk. If this is the case, consider

specialist referral to assess eligibility for a risk reducing agent.

2. If overall lifetime risk for the development of breast cancer is 20% or higher, the patient may

qualify for future screening with alternating mammogram and breast MRI.



Electronically signed and approved by: Leopold M. Fregoli, M.D. Radiologis
Detail Level: Zone

## 2024-08-26 ENCOUNTER — HOSPITAL ENCOUNTER (OUTPATIENT)
Dept: HOSPITAL 47 - RADMAMWWP | Age: 74
Discharge: HOME | End: 2024-08-26
Attending: FAMILY MEDICINE
Payer: MEDICARE

## 2024-08-26 PROCEDURE — 77067 SCR MAMMO BI INCL CAD: CPT

## 2024-08-26 PROCEDURE — 77063 BREAST TOMOSYNTHESIS BI: CPT

## 2024-09-03 NOTE — MM
Reason for Exam: Screening  (asymptomatic). 

Last screening mammogram was performed 12 month(s) ago.





Patient History: 

Menarche at age 13. First Full-Term Pregnancy at age 27. Left ovary removed at age 47. Right ovary

removed at age 47. Hysterectomy at age 47. Postmenopausal. Estrogen for 12 years from age 49 until

age 61. Patient used Hormonal Contraceptives for 15 years. 





Risk Values: 

Tamia 5 year model risk: 2.0%.

NCI Lifetime model risk: 5.1%.





Prior Study Comparison: 

7/29/2021 Bilateral Screening Mammogram, [facility]. 8/10/2022 Bilateral MG 3D screening mammo w/cad, [facility].

8/22/2023 Bilateral MG 3D screening mammo w/cad, [facility]. 





Tissue Density: 

There are scattered areas of fibroglandular density.





Findings: 

Analyzed By CAD. 

Right breast: There is no suspicious group of microcalcifications or new suspicious mass.



Left breast: Asymmetry CC view middle depth 8.0 cm from the nipple in the lateral aspect. Minimal

appreciated on the MLO view. 





Overall Assessment: Incomplete: need additional imaging evaluation, BI-RAD 0





Management: 

Diagnostic Mammogram of the left breast.

[facility] will attempt to contact patient to return for supplemental views and

ultrasound if indicated.



Patient should continue monthly self-breast exams.  A clinical breast exam by your physician is

recommended on an annual basis.

This exam should not preclude additional follow-up of suspicious palpable abnormalities.



Note on Tamia scores and lifetime risk:

1. A Tamia score greater than 3% is considered moderate risk. If this is the case, consider

specialist referral to assess eligibility for a risk reducing agent.

2. If overall lifetime risk for the development of breast cancer is 20% or higher, the patient may

qualify for future screening with alternating mammogram and breast MRI.



Electronically signed and approved by: João Lynch DO

## 2024-09-09 ENCOUNTER — HOSPITAL ENCOUNTER (OUTPATIENT)
Dept: HOSPITAL 47 - RADBDWWP | Age: 74
Discharge: HOME | End: 2024-09-09
Attending: FAMILY MEDICINE
Payer: MEDICARE

## 2024-09-09 PROCEDURE — 77080 DXA BONE DENSITY AXIAL: CPT

## 2024-09-09 NOTE — BD
EXAMINATION TYPE: Axial Bone Density

 

DATE OF EXAM: 9/9/2024

 

CLINICAL HISTORY: 73 years old Female.  ICD-10 CODE: M85.88DISRD OF BONE DENSITY AND STRUCTURE, OTHER
 S

 

Height:  63

Weight:  142

 

FRAX RISK QUESTIONS:

 

Alcohol (3 or more units per day):  no

Family History (Parent hip fracture):  yes

Glucocorticoids (More than 3mos):  no

           (Ex: prednisone, prednisolone, methylprednisolone, dexamethasone, and hydrocortisone).    
     

History of Fracture in Adulthood: yes

Secondary Osteoporosis:

  1.  Type 1 Diabetes: no

  2.  Hyperthyroidism: no

  3.  Menopause before 45: yes

  4.  Malnutrition: no

  5.  Chronic liver disease: no

Rheumatoid Arthritis: no

Current Tobacco Use: no

 

RISK FACTORS 

 

HISTORY OF: 

Surgery to Spine/Hip(right/left)/Wrist (right/left): no

 

 

EXAM MEASUREMENTS: 

Bone mineral densitometry was performed using the [employer] System.

Bone mineral density as measured about the Lumbar spine is:  

----- L1-L4(G/cm2): 0.939

T Score Values are as follows:

----- L1: -1.9

----- L2: -2.8

----- L3: -2.2

----- L4: -1.4

----- L1-L4: -2.0

 

Z Score Values are as follows:

----- L1: -0.2

----- L2: -1.1

----- L3: -0.5

----- L4: 0.4

----- L1-L4: decreased -0.3

 

Bone mineral density has: decreased -7.8 % since study of: 8.10.2022

 

Bone mineral density about the R hip (g/cm2): 0.875

Bone mineral density about the L hip (g/cm2): 0.894

T Score values are as follows:

-----R Neck: -1.5

-----L Neck: -1.5

-----R Total: -1.1

-----L Total: -0.9

 

Z Score values are as follows:

-----R Neck: 0.3

-----L Neck: 0.3

-----R Total: 0.6

-----L Total: 0.7

 

Bone mineral density has: decreased -4.0 % since study of: 8.10.2022

 

 

FRAX%s: The graph provided illustrates a 25.7% chance for a major osteoporotic fx and a 9.0% chance f
or the hips probability for fx in 10 years time.

 

 

 

 

IMPRESSION:

Osteopenia (T Score between -2.5 and -1).

 

There is slightly increased risk of fracture and the patient may be considered 

for treatment. 

 

Re-Screen 2-5 years.

 

NOTE:  T-SCORE=SD OF THE YOUNG ADULT MEAN.

## 2024-09-10 ENCOUNTER — HOSPITAL ENCOUNTER (OUTPATIENT)
Dept: HOSPITAL 47 - RADMAMWWP | Age: 74
Discharge: HOME | End: 2024-09-10
Attending: FAMILY MEDICINE
Payer: MEDICARE

## 2024-09-10 PROCEDURE — 77065 DX MAMMO INCL CAD UNI: CPT

## 2024-09-10 PROCEDURE — 77061 BREAST TOMOSYNTHESIS UNI: CPT

## 2024-09-10 NOTE — USB
Reason for Exam: Additional evaluation requested from abnormal screening. 





Patient History: 

Menarche at age 13. First Full-Term Pregnancy at age 27. Left ovary removed at age 47. Right ovary

removed at age 47. Hysterectomy at age 47. Postmenopausal. Estrogen for 12 years from age 49 until

age 61. Patient used Hormonal Contraceptives for 15 years. 





Risk Values: 

Tamia 5 year model risk: 2.0%.

NCI Lifetime model risk: 5.1%.

Technique: 

Method: Targeted.  





Prior Study Comparison: 

8/10/2022 Bilateral MG 3D screening mammo w/cad, Located within [facility]. 8/22/2023 Bilateral MG 3D screening mammo

w/cad, Located within [facility]. 8/26/2024 Bilateral MG 3D screening mammo w/cad, Located within [facility]. 





Findings: 

The upper outer quadrant of the left breast, the axilla of the left breast and the retroareolar of

the left breast were scanned.

No solid or cystic masses are identified.. 





Overall Assessment: Probably benign, BI-RAD 3





Management: 

Diagnostic Mammogram of the left breast in 6 months.

A clinical breast exam by your physician is recommended on an annual basis and results should be

correlated with mammographic findings.  This exam should not preclude additional follow-up of

suspicious palpable abnormalities.  Results were given to the patient verbally at the time of exam.



Electronically signed and approved by: Leopold M. Fregoli, M.D. Radiologis

## 2024-09-10 NOTE — MM
Reason for Exam: Additional evaluation requested from abnormal screening. 

Last screening mammogram was performed less than 1 month ago.





Patient History: 

Menarche at age 13. First Full-Term Pregnancy at age 27. Left ovary removed at age 47. Right ovary

removed at age 47. Hysterectomy at age 47. Postmenopausal. Estrogen for 12 years from age 49 until

age 61. Patient used Hormonal Contraceptives for 15 years. 





Risk Values: 

Tamia 5 year model risk: 2.0%.

NCI Lifetime model risk: 5.1%.





Prior Study Comparison: 

8/10/2022 Bilateral MG 3D screening mammo w/cad, [facility]. 8/22/2023 Bilateral MG 3D screening mammo

w/cad, [facility]. 8/26/2024 Bilateral MG 3D screening mammo w/cad, [facility]. 





Tissue Density: 

Left: The breasts are heterogeneously dense, which may obscure small masses.





Findings: 

Analyzed By CAD. 

7 cm from the nipple upper outer quadrant left breast there is a 5 mm nodular density. Ultrasound

recommended. 





Overall Assessment: Incomplete: need additional imaging evaluation, BI-RAD 0





Management: 

Diagnostic Breast Ultrasound of the left breast.

.  Results were given to the patient verbally at the time of exam.



Patient should continue monthly self-breast exams.  A clinical breast exam by your physician is

recommended on an annual basis.

This exam should not preclude additional follow-up of suspicious palpable abnormalities.





Note on Tamia scores and lifetime risk:

1. A Tamia score greater than 3% is considered moderate risk. If this is the case, consider

specialist referral to assess eligibility for a risk reducing agent.

2. If overall lifetime risk for the development of breast cancer is 20% or higher, the patient may

qualify for future screening with alternating mammogram and breast MRI.



Electronically signed and approved by: Leopold M. Fregoli, M.D. Radiologis